# Patient Record
Sex: MALE | Race: WHITE | NOT HISPANIC OR LATINO | URBAN - METROPOLITAN AREA
[De-identification: names, ages, dates, MRNs, and addresses within clinical notes are randomized per-mention and may not be internally consistent; named-entity substitution may affect disease eponyms.]

---

## 2019-11-20 ENCOUNTER — EMERGENCY (EMERGENCY)
Facility: HOSPITAL | Age: 29
LOS: 1 days | Discharge: HOME | End: 2019-11-20
Attending: EMERGENCY MEDICINE
Payer: MEDICAID

## 2019-11-20 VITALS
TEMPERATURE: 98 F | HEIGHT: 71 IN | HEART RATE: 77 BPM | WEIGHT: 179.9 LBS | SYSTOLIC BLOOD PRESSURE: 116 MMHG | DIASTOLIC BLOOD PRESSURE: 73 MMHG | RESPIRATION RATE: 20 BRPM | OXYGEN SATURATION: 99 %

## 2019-11-20 PROCEDURE — 99283 EMERGENCY DEPT VISIT LOW MDM: CPT

## 2019-11-20 PROCEDURE — 99282 EMERGENCY DEPT VISIT SF MDM: CPT

## 2019-11-20 RX ORDER — ONDANSETRON 8 MG/1
1 TABLET, FILM COATED ORAL
Qty: 21 | Refills: 0
Start: 2019-11-20

## 2019-11-20 NOTE — ED PROVIDER NOTE - PATIENT PORTAL LINK FT
You can access the FollowMyHealth Patient Portal offered by [facility] by registering at the following website: [email or web address]. By joining [facility]’s FollowMyHealth portal, you will also be able to view your health information using other applications (apps) compatible with our system.

## 2019-11-20 NOTE — ED PROVIDER NOTE - CLINICAL SUMMARY MEDICAL DECISION MAKING FREE TEXT BOX
diarrhea without fever, only 4 per day, no recent travel, no blood, normal vitals, no need for intervention

## 2019-11-20 NOTE — ED PROVIDER NOTE - NSFOLLOWUPINSTRUCTIONS_ED_ALL_ED_FT
Viral Gastroenteritis, Adult    Viral gastroenteritis is also known as the stomach flu. This condition is caused by various viruses. These viruses can be passed from person to person very easily (are very contagious). It can cause sudden watery diarrhea, fever, and vomiting.    Diarrhea and vomiting can make you feel weak and cause you to become dehydrated. You may not be able to keep fluids down. Dehydration can make you tired and thirsty, cause you to have a dry mouth, and decrease how often you urinate. Older adults and people with other diseases or a weak immune system are at higher risk for dehydration.    It is important to replace the fluids that you lose from diarrhea and vomiting. If you become severely dehydrated, you may need to get fluids through an IV tube.    What are the causes?  Gastroenteritis is caused by various viruses, including rotavirus and norovirus. Norovirus is the most common cause in adults.  You can get sick by eating food, drinking water, or touching a surface contaminated with one of these viruses. You can also get sick from sharing utensils or other personal items with an infected person.    How is this diagnosed?  This condition is diagnosed with a medical history and physical exam. You may also have a stool test to check for viruses or other infections.    How is this treated?  This condition typically goes away on its own. The focus of treatment is to restore lost fluids (rehydration). Your health care provider may recommend that you take an oral rehydration solution (ORS) to replace important salts and minerals (electrolytes) in your body. Severe cases of this condition may require giving fluids through an IV tube.  Treatment may also include medicine to help with your symptoms.    Follow these instructions at home:    -Take an ORS- Oral Rehydration Supplement. This is a drink that is sold at pharmacies and retail stores.   - Drink clear fluids in small amounts as you are able. Clear fluids include water, ice chips, diluted fruit juice, and low-calorie sports drinks.   -Eat bland, easy-to-digest foods in small amounts as you are able. These foods include bananas, applesauce, rice, lean meats, toast, and crackers.   - Avoid fluids that contain a lot of sugar or caffeine, such as energy drinks, sports drinks, and soda.   - Avoid alcohol.   - Avoid spicy or fatty foods.    - Drink enough fluid to keep your urine clear or pale yellow.  -Wash your hands often. If soap and water are not available, use hand .  - Make sure that all people in your household wash their hands well and often.  - Take over-the-counter and prescription medicines only as told by your health care provider.  - Rest at home while you recover.  - Watch your condition for any changes.  - Take a warm bath to relieve any burning or pain from frequent diarrhea episodes.  - Keep all follow-up visits as told by your health care provider. This is important.     Contact a health care provider if:  - You cannot keep fluids down.  - Your symptoms get worse.  - You have new symptoms.   - You feel light-headed or dizzy.   - You have muscle cramps.     Get help right away if:  You have chest pain. You feel extremely weak or you faint. You see blood in your vomit .Your vomit looks like coffee grounds. You have bloody or black stools or stools that look like tar. You have a severe headache, a stiff neck, or both. You have a rash. You have severe pain, cramping, or bloating in your abdomen. You have trouble breathing or you are breathing very quickly. Your heart is beating very quickly. Your skin feels cold and clammy. You feel confused. You have pain when you urinate.   You have signs of dehydration, such as: Dark urine, very little urine, or no urine. Cracked lips. Dry mouth. Sunken eyes .Sleepiness. Weakness.   This information is not intended to replace advice given to you by your health care provider. Make sure you discuss any questions you have with your health care provider.

## 2019-11-20 NOTE — ED PROVIDER NOTE - OBJECTIVE STATEMENT
28 y/o M patient w/ no significant PMHx and no significant PSHx presents to the ED with diarrhea x4 a day for x4 days. Pt denies vomiting, abdominal pain, blood in diarrhea, fever and any other complaints. Patient denies recent travel and states he has never had this before. NKDA.
